# Patient Record
Sex: FEMALE | Race: WHITE | URBAN - METROPOLITAN AREA
[De-identification: names, ages, dates, MRNs, and addresses within clinical notes are randomized per-mention and may not be internally consistent; named-entity substitution may affect disease eponyms.]

---

## 2020-12-08 ENCOUNTER — VIRTUAL VISIT (OUTPATIENT)
Dept: INTERNAL MEDICINE | Age: 35
End: 2020-12-08
Payer: COMMERCIAL

## 2020-12-08 DIAGNOSIS — Z20.822 CLOSE EXPOSURE TO COVID-19 VIRUS: ICD-10-CM

## 2020-12-08 PROCEDURE — 99442 PR PHYS/QHP TELEPHONE EVALUATION 11-20 MIN: CPT | Performed by: NURSE PRACTITIONER

## 2020-12-08 ASSESSMENT — PATIENT HEALTH QUESTIONNAIRE - PHQ9
SUM OF ALL RESPONSES TO PHQ QUESTIONS 1-9: 0
1. LITTLE INTEREST OR PLEASURE IN DOING THINGS: 0
2. FEELING DOWN, DEPRESSED OR HOPELESS: 0
SUM OF ALL RESPONSES TO PHQ9 QUESTIONS 1 & 2: 0

## 2020-12-08 ASSESSMENT — ENCOUNTER SYMPTOMS
FACIAL SWELLING: 0
DIARRHEA: 0
SORE THROAT: 0
WHEEZING: 0
CHEST TIGHTNESS: 0
VOMITING: 0
COUGH: 0
NAUSEA: 0
SHORTNESS OF BREATH: 0
TROUBLE SWALLOWING: 0

## 2020-12-08 NOTE — LETTER
[facility]  [address] [facility] [address]  Phone: [phone]  Fax: [address], APRN    December 8, 2020     Patient: Abraham Ambrosio   Date of Visit: 12/8/2020       To Whom it May Concern:    Abraham Ambrosio was evaluated during a virtual visit and tested today in the clinic. It is my medical opinion that the patient should not return to work/school until COVID-19 test results are back. We expect results in 3-8 days. If there are questions or concerns, please have the patient contact our office. Thank you for your assistance in this matter.           Sincerely,        Electronically signed by CHARLA Mann on 12/8/2020 at 11:45 AM

## 2020-12-08 NOTE — PROGRESS NOTES
Douglas Barry, 38 y.o. 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 Review of Systems   Constitutional: Negative for chills, fatigue and fever. HENT: Negative for congestion, facial swelling, sore throat and trouble swallowing. Respiratory: Negative for cough, chest tightness, shortness of breath and wheezing. Cardiovascular: Negative for chest pain and palpitations. Gastrointestinal: Negative for diarrhea, nausea and vomiting. Musculoskeletal: Negative for arthralgias, myalgias and neck pain. Neurological: Negative for dizziness, light-headedness and headaches. Physical Exam     Due to the current efforts to prevent transmission of COVID-19 and also the need to preserve PPE for other caregivers, a face-to-face encounter with the patient was not performed. That being said, all relevant records and diagnostic tests were reviewed, including laboratory results and imaging. Please reference any relevant documentation elsewhere. Care will be coordinated with the primary service. Assessment/Plan:  1. Close exposure to COVID-19 virus    - COVID-19 Ambulatory;  Future  - Self quarantine, only going out for Dr's appts/essentials  - OTC symptom control  - Continue to wear mask, social distance, and wash hands frequently  - Call 911 or go to the ER should symptoms become difficult to manage       Telephone visit 12 minutes    CHARLA Mcdowell  12/8/20     This telephone visit was provided as a focused evaluation during the [employer] -19 pandemic/national emergency. A comprehensive review of all previous patient history and testing was not conducted. Pertinent findings were elicited during the visit.

## 2020-12-09 LAB
SARS-COV-2: NOT DETECTED
SOURCE: NORMAL

## 2023-08-23 ENCOUNTER — HOSPITAL ENCOUNTER (EMERGENCY)
Age: 38
Discharge: HOME | End: 2023-08-23
Payer: COMMERCIAL

## 2023-08-23 VITALS
RESPIRATION RATE: 10 BRPM | OXYGEN SATURATION: 100 % | DIASTOLIC BLOOD PRESSURE: 76 MMHG | SYSTOLIC BLOOD PRESSURE: 118 MMHG | HEART RATE: 71 BPM

## 2023-08-23 VITALS — OXYGEN SATURATION: 100 %

## 2023-08-23 VITALS
HEART RATE: 77 BPM | DIASTOLIC BLOOD PRESSURE: 84 MMHG | RESPIRATION RATE: 18 BRPM | TEMPERATURE: 97 F | OXYGEN SATURATION: 100 % | SYSTOLIC BLOOD PRESSURE: 132 MMHG

## 2023-08-23 VITALS — OXYGEN SATURATION: 100 % | HEART RATE: 72 BPM | RESPIRATION RATE: 12 BRPM

## 2023-08-23 VITALS — BODY MASS INDEX: 34.2 KG/M2

## 2023-08-23 DIAGNOSIS — R07.9: Primary | ICD-10-CM

## 2023-08-23 DIAGNOSIS — K21.9: ICD-10-CM

## 2023-08-23 DIAGNOSIS — R10.13: ICD-10-CM

## 2023-08-23 DIAGNOSIS — Z87.891: ICD-10-CM

## 2023-08-23 LAB
ANION GAP: 3 (ref 5–15)
BUN SERPL-MCNC: 15 MG/DL (ref 7–18)
BUN/CREAT RATIO: 13.6 RATIO (ref 10–20)
CALCIUM SERPL-MCNC: 8.8 MG/DL (ref 8.5–10.1)
CARBON DIOXIDE: 30 MMOL/L (ref 21–32)
CHLORIDE: 107 MMOL/L (ref 98–107)
DEPRECATED RDW RBC: 40 FL (ref 35.1–43.9)
ERYTHROCYTE [DISTWIDTH] IN BLOOD: 12.3 % (ref 11.6–14.6)
EST GLOM FILT RATE - AFR AMER: 71 ML/MIN (ref 60–?)
ESTIMATED CREATININE CLEARANCE: 62.4 ML/MIN
GLUCOSE: 97 MG/DL (ref 74–106)
HCT VFR BLD AUTO: 36 % (ref 37–47)
HEMOGLOBIN: 11.6 G/DL (ref 12–15)
HGB BLD-MCNC: 11.6 G/DL (ref 12–15)
IMMATURE GRANULOCYTES COUNT: 0.02 X10^3/UL (ref 0–0)
LIPASE: 37 U/L (ref 13–75)
MCV RBC: 88.7 FL (ref 81–99)
MEAN CORP HGB CONC: 32.2 G/DL (ref 32–36)
MEAN PLATELET VOL.: 9.9 FL (ref 6.2–12)
NRBC FLAGGED BY ANALYZER: 0 % (ref 0–5)
PLATELET # BLD: 294 K/MM3 (ref 150–450)
PLATELET COUNT: 294 K/MM3 (ref 150–450)
POTASSIUM: 4 MMOL/L (ref 3.5–5.1)
RBC # BLD AUTO: 4.06 M/MM3 (ref 4.2–5.4)
RBC DISTRIBUTION WIDTH CV: 12.3 % (ref 11.6–14.6)
RBC DISTRIBUTION WIDTH SD: 40 FL (ref 35.1–43.9)
TROPONIN-I HS: < 3 PG/ML (ref 3–54)
WBC # BLD AUTO: 6.9 K/MM3 (ref 4.4–11)
WHITE BLOOD COUNT: 6.9 K/MM3 (ref 4.4–11)

## 2023-08-23 PROCEDURE — 80048 BASIC METABOLIC PNL TOTAL CA: CPT

## 2023-08-23 PROCEDURE — 99284 EMERGENCY DEPT VISIT MOD MDM: CPT

## 2023-08-23 PROCEDURE — A4216 STERILE WATER/SALINE, 10 ML: HCPCS

## 2023-08-23 PROCEDURE — 93005 ELECTROCARDIOGRAM TRACING: CPT

## 2023-08-23 PROCEDURE — 71045 X-RAY EXAM CHEST 1 VIEW: CPT

## 2023-08-23 PROCEDURE — 83690 ASSAY OF LIPASE: CPT

## 2023-08-23 PROCEDURE — 84484 ASSAY OF TROPONIN QUANT: CPT

## 2023-08-23 PROCEDURE — 85025 COMPLETE CBC W/AUTO DIFF WBC: CPT

## 2023-08-25 NOTE — PROGRESS NOTES
Subjective        Palak Murphy. Is 37 y.o.. female. Here for follow up office visit-       Chief Complaint   Patient presents with    Follow-up     ED    Immunizations     Declining flu vaccination       HPI:      How is patient doing today?  Good      Follow up for ED/UC/Hospital admission:  Date(s):  8/23/23      Dx:  Chest pain  Acute epigastric pain- better after she burped/passed gas   Anemia- 11.6/36.0    Labs- troponin- <3    EKG was done  CXR- no acute change    Treatment-  OTC nexium or prilosec     Pt c/o vaginal yeast infection- wants diflucan - will treat today  and since recurrent - will check glucose     Pt is having regular menses       Pt is having urinary symptoms on and off- burning and discomfort- will check urine today     Patient Care Team:  Charla Culp MD as PCP - General    REVIEW OF SYSTEMS:        Objective      Vitals:    09/05/23 1406   BP: 96/65   BP Location: Left arm   Patient Position: Sitting   BP Cuff Size: Adult   Pulse: 63   Temp: 36.6 °C (97.9 °F)   TempSrc: Temporal   SpO2: 98%   Weight: 92.2 kg (203 lb 3.2 oz)       PHYSICAL:      Patient is alert and oriented x 3 , NAD  HEAD- normocephalic and atraumatic   EYES-conjunctiva-normal, lids - normal  EARS/NOSE- normal external exam   NECK-supple,FROM  CV- RRR without murmur  PULM- CTA bilaterally, normal respiratory effort  RESPIRATORY EFFORT- normal , no retractions or nasal flaring   ABD- normoactive BS's   EXT- no edema,NT  SKIN- no abnormal skin lesions noted  NEURO- no focal deficits  PSYCH- pleasant, normal judgement and insight      BP Readings from Last 3 Encounters:   09/05/23 96/65   02/24/23 101/67   12/14/22 105/70         Wt Readings from Last 3 Encounters:   09/05/23 92.2 kg (203 lb 3.2 oz)   02/24/23 91.6 kg (202 lb)   12/14/22 92.2 kg (203 lb 3.2 oz)       BMI Readings from Last 3 Encounters:   09/05/23 33.81 kg/m²   02/24/23 33.61 kg/m²   12/14/22 33.81 kg/m²           The number and complexity of problems  addressed is considered moderate.  The amount and/or complexity of data reviewed and analyzed is considered moderate. The risk of complications and/or morbidity/mortality of patient is considered moderate. Overall, this patient encounter is considered a moderate risk visit.    The patient is here for a hospital follow up.  Hospital records were reviewed prior to visit, including relevant labs, imaging findings, consultant notes, and discharge summary.  Medications were reconciled and are current, reviewed today.    Time spent reviewing hospital records prior to today's face-to-face office visit=     10    minutes        Discussed - considering anxiety medication- - daily preventative medication-     Also discussed weight - -discussed seeing endocrinologist to discuss - pt wants to consider adipex or ozempic    Pt is doing weight watchers       Pt declines nutritionist              Assessment/Plan      Problem List Items Addressed This Visit    None  Visit Diagnoses       Hospital discharge follow-up    -  Primary    Chest pain, unspecified type        Epigastric pain        Relevant Orders    Referral to Gastroenterology    Anemia, unspecified type        Relevant Orders    Ferritin    Folate    Iron    Vitamin B12    Referral to Gastroenterology    Acute vaginitis        Relevant Medications    fluconazole (Diflucan) 150 mg tablet    Dysuria        Relevant Orders    POCT UA Automated manually resulted    Urine Culture    DM screen        Screening for diabetes mellitus        Relevant Orders    Glucose    Urinary frequency        Relevant Orders    POCT UA Automated manually resulted    Class 1 obesity with body mass index (BMI) of 33.0 to 33.9 in adult, unspecified obesity type, unspecified whether serious comorbidity present        Relevant Orders    Referral to Endocrinology                            Current Outpatient Medications:     cholecalciferol (Vitamin D-3) 5,000 Units tablet, Take by mouth once  daily., Disp: , Rfl:     prenatal vit/iron fum/folic ac (RIGHT STEP PRENATAL VITAMINS ORAL), Take by mouth once daily., Disp: , Rfl:     saccharomyces boulardii (Florastor) 250 mg capsule, Take 1 capsule (250 mg) by mouth once daily., Disp: , Rfl:     valACYclovir (Valtrex) 1 gram tablet, Take 1 tablet (1,000 mg) by mouth 3 times a day., Disp: , Rfl:     fluconazole (Diflucan) 150 mg tablet, Take 1 tablet (150 mg) by mouth 1 time for 1 dose., Disp: 1 tablet, Rfl: 0      Ordered lab      Follow up 6-8 weeks

## 2023-09-05 ENCOUNTER — LAB (OUTPATIENT)
Dept: LAB | Facility: LAB | Age: 38
End: 2023-09-05
Payer: COMMERCIAL

## 2023-09-05 ENCOUNTER — OFFICE VISIT (OUTPATIENT)
Dept: PRIMARY CARE | Facility: CLINIC | Age: 38
End: 2023-09-05
Payer: COMMERCIAL

## 2023-09-05 VITALS
WEIGHT: 203.2 LBS | BODY MASS INDEX: 33.81 KG/M2 | DIASTOLIC BLOOD PRESSURE: 65 MMHG | HEART RATE: 63 BPM | TEMPERATURE: 97.9 F | OXYGEN SATURATION: 98 % | SYSTOLIC BLOOD PRESSURE: 96 MMHG

## 2023-09-05 DIAGNOSIS — R30.0 DYSURIA: ICD-10-CM

## 2023-09-05 DIAGNOSIS — D64.9 ANEMIA, UNSPECIFIED TYPE: ICD-10-CM

## 2023-09-05 DIAGNOSIS — E66.9 CLASS 1 OBESITY WITH BODY MASS INDEX (BMI) OF 33.0 TO 33.9 IN ADULT, UNSPECIFIED OBESITY TYPE, UNSPECIFIED WHETHER SERIOUS COMORBIDITY PRESENT: ICD-10-CM

## 2023-09-05 DIAGNOSIS — R35.0 URINARY FREQUENCY: ICD-10-CM

## 2023-09-05 DIAGNOSIS — Z13.1 SCREENING FOR DIABETES MELLITUS: ICD-10-CM

## 2023-09-05 DIAGNOSIS — R07.9 CHEST PAIN, UNSPECIFIED TYPE: ICD-10-CM

## 2023-09-05 DIAGNOSIS — N76.0 ACUTE VAGINITIS: ICD-10-CM

## 2023-09-05 DIAGNOSIS — R10.13 EPIGASTRIC PAIN: ICD-10-CM

## 2023-09-05 DIAGNOSIS — Z13.1 SCREENING FOR DIABETES MELLITUS (DM): ICD-10-CM

## 2023-09-05 DIAGNOSIS — Z09 HOSPITAL DISCHARGE FOLLOW-UP: Primary | ICD-10-CM

## 2023-09-05 PROBLEM — Z86.19 HISTORY OF HERPES GENITALIS: Status: ACTIVE | Noted: 2020-06-24

## 2023-09-05 PROBLEM — K80.00 ACUTE CHOLECYSTITIS DUE TO BILIARY CALCULUS: Status: ACTIVE | Noted: 2021-06-01

## 2023-09-05 PROBLEM — H91.92 HEARING LOSS, LEFT: Status: ACTIVE | Noted: 2023-09-05

## 2023-09-05 PROBLEM — A60.00 HERPES GENITALIA: Status: ACTIVE | Noted: 2023-09-05

## 2023-09-05 PROBLEM — N30.90 BLADDER INFECTION: Status: ACTIVE | Noted: 2023-08-21

## 2023-09-05 PROBLEM — R63.5 WEIGHT GAIN: Status: ACTIVE | Noted: 2023-09-05

## 2023-09-05 PROBLEM — N64.89 BREAST ASYMMETRY: Status: ACTIVE | Noted: 2023-09-05

## 2023-09-05 PROBLEM — M51.36 DEGENERATIVE DISC DISEASE, LUMBAR: Status: ACTIVE | Noted: 2023-09-05

## 2023-09-05 PROBLEM — F41.9 ANXIETY: Status: ACTIVE | Noted: 2023-08-14

## 2023-09-05 LAB
POC APPEARANCE, URINE: CLEAR
POC BILIRUBIN, URINE: NEGATIVE
POC BLOOD, URINE: ABNORMAL
POC COLOR, URINE: YELLOW
POC GLUCOSE, URINE: NEGATIVE MG/DL
POC KETONES, URINE: NEGATIVE MG/DL
POC LEUKOCYTES, URINE: ABNORMAL
POC NITRITE,URINE: NEGATIVE
POC PH, URINE: 6.5 PH
POC PROTEIN, URINE: NEGATIVE MG/DL
POC SPECIFIC GRAVITY, URINE: <=1.005
POC UROBILINOGEN, URINE: 0.2 EU/DL

## 2023-09-05 PROCEDURE — 81003 URINALYSIS AUTO W/O SCOPE
CPT | Mod: SIGNIFICANT, SEPARATELY IDENTIFIABLE EVALUATION AND MANAGEMENT SERVICE BY THE SAME PHYSICIAN ON THE SAME DAY OF THE PROCEDURE OR OTHER SERVICE | Performed by: FAMILY MEDICINE

## 2023-09-05 PROCEDURE — 99214 OFFICE O/P EST MOD 30 MIN: CPT | Performed by: FAMILY MEDICINE

## 2023-09-05 PROCEDURE — 82728 ASSAY OF FERRITIN: CPT

## 2023-09-05 PROCEDURE — 87186 SC STD MICRODIL/AGAR DIL: CPT

## 2023-09-05 PROCEDURE — 82607 VITAMIN B-12: CPT

## 2023-09-05 PROCEDURE — 3008F BODY MASS INDEX DOCD: CPT | Performed by: FAMILY MEDICINE

## 2023-09-05 PROCEDURE — 87077 CULTURE AEROBIC IDENTIFY: CPT

## 2023-09-05 PROCEDURE — 82947 ASSAY GLUCOSE BLOOD QUANT: CPT

## 2023-09-05 PROCEDURE — 82746 ASSAY OF FOLIC ACID SERUM: CPT

## 2023-09-05 PROCEDURE — 36415 COLL VENOUS BLD VENIPUNCTURE: CPT

## 2023-09-05 PROCEDURE — 83540 ASSAY OF IRON: CPT

## 2023-09-05 PROCEDURE — 87086 URINE CULTURE/COLONY COUNT: CPT

## 2023-09-05 PROCEDURE — 1036F TOBACCO NON-USER: CPT | Performed by: FAMILY MEDICINE

## 2023-09-05 RX ORDER — VALACYCLOVIR HYDROCHLORIDE 1 G/1
1000 TABLET, FILM COATED ORAL 3 TIMES DAILY
COMMUNITY
Start: 2023-02-24

## 2023-09-05 RX ORDER — ACETAMINOPHEN 500 MG
TABLET ORAL DAILY
COMMUNITY

## 2023-09-05 RX ORDER — GLUCOSAMINE/CHONDR SU A SOD 750-600 MG
TABLET ORAL
COMMUNITY
End: 2023-09-05 | Stop reason: WASHOUT

## 2023-09-05 RX ORDER — FLUCONAZOLE 150 MG/1
150 TABLET ORAL ONCE
Qty: 1 TABLET | Refills: 0 | Status: SHIPPED | OUTPATIENT
Start: 2023-09-05 | End: 2023-09-05

## 2023-09-05 RX ORDER — BUTYROSPERMUM PARKII(SHEA BUTTER), SIMMONDSIA CHINENSIS (JOJOBA) SEED OIL, ALOE BARBADENSIS LEAF EXTRACT .01; 1; 3.5 G/100G; G/100G; G/100G
250 LIQUID TOPICAL
COMMUNITY

## 2023-09-06 LAB
COBALAMIN (VITAMIN B12) (PG/ML) IN SER/PLAS: 550 PG/ML (ref 211–911)
FERRITIN (UG/LL) IN SER/PLAS: 130 UG/L (ref 8–150)
FOLATE (NG/ML) IN SER/PLAS: >24 NG/ML
GLUCOSE (MG/DL) IN SER/PLAS: 89 MG/DL (ref 74–99)
IRON (UG/DL) IN SER/PLAS: 60 UG/DL (ref 35–150)

## 2023-09-07 DIAGNOSIS — R35.0 URINARY FREQUENCY: Primary | ICD-10-CM

## 2023-09-07 DIAGNOSIS — R30.0 DYSURIA: ICD-10-CM

## 2023-09-07 RX ORDER — NITROFURANTOIN 25; 75 MG/1; MG/1
100 CAPSULE ORAL 2 TIMES DAILY
Qty: 14 CAPSULE | Refills: 0 | Status: SHIPPED | OUTPATIENT
Start: 2023-09-07 | End: 2023-09-14

## 2023-09-08 LAB — URINE CULTURE: ABNORMAL

## 2023-10-13 ENCOUNTER — APPOINTMENT (OUTPATIENT)
Dept: PRIMARY CARE | Facility: CLINIC | Age: 38
End: 2023-10-13
Payer: COMMERCIAL